# Patient Record
Sex: MALE | Race: WHITE | Employment: FULL TIME | URBAN - METROPOLITAN AREA
[De-identification: names, ages, dates, MRNs, and addresses within clinical notes are randomized per-mention and may not be internally consistent; named-entity substitution may affect disease eponyms.]

---

## 2017-09-14 ENCOUNTER — APPOINTMENT (OUTPATIENT)
Dept: CT IMAGING | Age: 47
End: 2017-09-14
Payer: COMMERCIAL

## 2017-09-14 ENCOUNTER — HOSPITAL ENCOUNTER (EMERGENCY)
Age: 47
Discharge: HOME | End: 2017-09-14
Attending: EMERGENCY MEDICINE
Payer: COMMERCIAL

## 2017-09-14 ENCOUNTER — APPOINTMENT (OUTPATIENT)
Dept: GENERAL RADIOLOGY | Age: 47
End: 2017-09-14
Payer: COMMERCIAL

## 2017-09-14 VITALS
RESPIRATION RATE: 18 BRPM | BODY MASS INDEX: 31.34 KG/M2 | HEART RATE: 89 BPM | TEMPERATURE: 98.1 F | DIASTOLIC BLOOD PRESSURE: 90 MMHG | OXYGEN SATURATION: 95 % | HEIGHT: 66 IN | WEIGHT: 195 LBS | SYSTOLIC BLOOD PRESSURE: 156 MMHG

## 2017-09-14 DIAGNOSIS — S40.021A CONTUSION, SHOULDER AND UPPER ARM, MULTIPLE SITES, RIGHT, INITIAL ENCOUNTER: ICD-10-CM

## 2017-09-14 DIAGNOSIS — S20.211A RIB CONTUSION, RIGHT, INITIAL ENCOUNTER: ICD-10-CM

## 2017-09-14 DIAGNOSIS — S13.9XXA SPRAIN OF NECK, INITIAL ENCOUNTER: ICD-10-CM
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
## 2017-09-14 DIAGNOSIS — S40.011A CONTUSION, SHOULDER AND UPPER ARM, MULTIPLE SITES, RIGHT, INITIAL ENCOUNTER: ICD-10-CM

## 2017-09-14 PROCEDURE — 72050 X-RAY EXAM NECK SPINE 4/5VWS: CPT

## 2017-09-14 PROCEDURE — 73200 CT UPPER EXTREMITY W/O DYE: CPT

## 2017-09-14 PROCEDURE — 99283 EMERGENCY DEPT VISIT LOW MDM: CPT

## 2017-09-14 PROCEDURE — 6370000000 HC RX 637 (ALT 250 FOR IP): Performed by: EMERGENCY MEDICINE

## 2017-09-14 PROCEDURE — 73030 X-RAY EXAM OF SHOULDER: CPT

## 2017-09-14 PROCEDURE — 71020 XR CHEST STANDARD TWO VW: CPT

## 2017-09-14 RX ORDER — TRAMADOL HYDROCHLORIDE 50 MG/1
50 TABLET ORAL EVERY 6 HOURS PRN
Qty: 20 TABLET | Refills: 0 | Status: SHIPPED | OUTPATIENT
Start: 2017-09-14 | End: 2017-09-24

## 2017-09-14 RX ORDER — CYCLOBENZAPRINE HCL 10 MG
10 TABLET ORAL 3 TIMES DAILY PRN
Qty: 15 TABLET | Refills: 0 | Status: SHIPPED | OUTPATIENT
Start: 2017-09-14 | End: 2017-09-24

## 2017-09-14 RX ORDER — TRAMADOL HYDROCHLORIDE 50 MG/1
100 TABLET ORAL ONCE
Status: COMPLETED | OUTPATIENT
Start: 2017-09-14 | End: 2017-09-14

## 2017-09-14 RX ADMIN — TRAMADOL HYDROCHLORIDE 100 MG: 50 TABLET, COATED ORAL at 03:26

## 2017-09-14 ASSESSMENT — PAIN DESCRIPTION - ORIENTATION: ORIENTATION: RIGHT

## 2017-09-14 ASSESSMENT — ENCOUNTER SYMPTOMS
COUGH: 0
BLOOD IN STOOL: 0
WHEEZING: 0
CHOKING: 0
FACIAL SWELLING: 0
CHEST TIGHTNESS: 0
ABDOMINAL PAIN: 0
STRIDOR: 0
SINUS PRESSURE: 0
EYE DISCHARGE: 0
VOMITING: 0
SORE THROAT: 0
VOICE CHANGE: 0
TROUBLE SWALLOWING: 0
EYE PAIN: 0
DIARRHEA: 0
SHORTNESS OF BREATH: 0
BACK PAIN: 0
CONSTIPATION: 0
EYE REDNESS: 0

## 2017-09-14 ASSESSMENT — PAIN DESCRIPTION - LOCATION: LOCATION: SHOULDER

## 2017-09-14 ASSESSMENT — PAIN SCALES - GENERAL
PAINLEVEL_OUTOF10: 2
PAINLEVEL_OUTOF10: 1

## 2017-09-14 ASSESSMENT — PAIN DESCRIPTION - DESCRIPTORS: DESCRIPTORS: ACHING

## 2017-09-25 ENCOUNTER — HOSPITAL ENCOUNTER (OUTPATIENT)
Dept: GENERAL RADIOLOGY | Age: 47
Setting detail: THERAPIES SERIES
Discharge: HOME | End: 2017-09-25

## 2017-09-25 DIAGNOSIS — S20.211A BRUISED RIB, RIGHT, INITIAL ENCOUNTER: ICD-10-CM

## 2017-10-13 ENCOUNTER — HOSPITAL ENCOUNTER (OUTPATIENT)
Dept: CT IMAGING | Age: 47
Discharge: HOME | End: 2017-10-13
Payer: COMMERCIAL

## 2017-10-13 VITALS — HEIGHT: 66 IN | WEIGHT: 195 LBS | BODY MASS INDEX: 31.34 KG/M2

## 2017-10-13 DIAGNOSIS — S22.39XA: ICD-10-CM

## 2017-10-13 DIAGNOSIS — T14.8XXA HEMATOMA: ICD-10-CM

## 2017-10-13 PROCEDURE — 71250 CT THORAX DX C-: CPT

## 2018-04-04 ENCOUNTER — HOSPITAL ENCOUNTER (EMERGENCY)
Age: 48
Discharge: HOME | End: 2018-04-04
Attending: EMERGENCY MEDICINE

## 2018-04-04 VITALS
BODY MASS INDEX: 32.95 KG/M2 | WEIGHT: 205 LBS | OXYGEN SATURATION: 98 % | HEIGHT: 66 IN | RESPIRATION RATE: 16 BRPM | DIASTOLIC BLOOD PRESSURE: 83 MMHG | TEMPERATURE: 98.4 F | SYSTOLIC BLOOD PRESSURE: 130 MMHG | HEART RATE: 99 BPM

## 2018-04-04 DIAGNOSIS — L03.119 CELLULITIS AND ABSCESS OF LEG: ICD-10-CM

## 2018-04-04 DIAGNOSIS — L02.419 CELLULITIS AND ABSCESS OF LEG: ICD-10-CM
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
## 2018-04-04 PROCEDURE — 2500000003 HC RX 250 WO HCPCS: Performed by: EMERGENCY MEDICINE

## 2018-04-04 PROCEDURE — 99282 EMERGENCY DEPT VISIT SF MDM: CPT

## 2018-04-04 PROCEDURE — 6360000002 HC RX W HCPCS: Performed by: EMERGENCY MEDICINE

## 2018-04-04 PROCEDURE — 6370000000 HC RX 637 (ALT 250 FOR IP): Performed by: EMERGENCY MEDICINE

## 2018-04-04 PROCEDURE — 96375 TX/PRO/DX INJ NEW DRUG ADDON: CPT

## 2018-04-04 PROCEDURE — 96365 THER/PROPH/DIAG IV INF INIT: CPT

## 2018-04-04 RX ORDER — GINSENG 100 MG
CAPSULE ORAL ONCE
Status: COMPLETED | OUTPATIENT
Start: 2018-04-04 | End: 2018-04-04

## 2018-04-04 RX ORDER — IBUPROFEN 200 MG
800 TABLET ORAL EVERY 6 HOURS PRN
COMMUNITY

## 2018-04-04 RX ORDER — HYDROCODONE BITARTRATE AND ACETAMINOPHEN 5; 325 MG/1; MG/1
1 TABLET ORAL EVERY 6 HOURS PRN
Qty: 10 TABLET | Refills: 0 | Status: SHIPPED | OUTPATIENT
Start: 2018-04-04 | End: 2018-04-11

## 2018-04-04 RX ORDER — CLINDAMYCIN HYDROCHLORIDE 300 MG/1
300 CAPSULE ORAL 3 TIMES DAILY
Qty: 30 CAPSULE | Refills: 0 | Status: SHIPPED | OUTPATIENT
Start: 2018-04-04 | End: 2018-04-14

## 2018-04-04 RX ORDER — KETOROLAC TROMETHAMINE 30 MG/ML
30 INJECTION, SOLUTION INTRAMUSCULAR; INTRAVENOUS ONCE
Status: COMPLETED | OUTPATIENT
Start: 2018-04-04 | End: 2018-04-04

## 2018-04-04 RX ORDER — CLINDAMYCIN PHOSPHATE 600 MG/50ML
600 INJECTION INTRAVENOUS ONCE
Status: COMPLETED | OUTPATIENT
Start: 2018-04-04 | End: 2018-04-04

## 2018-04-04 RX ORDER — IBUPROFEN 800 MG/1
800 TABLET ORAL EVERY 8 HOURS PRN
Qty: 20 TABLET | Refills: 0 | Status: SHIPPED | OUTPATIENT
Start: 2018-04-04

## 2018-04-04 RX ADMIN — CLINDAMYCIN PHOSPHATE 600 MG: 12 INJECTION, SOLUTION INTRAMUSCULAR; INTRAVENOUS at 13:58

## 2018-04-04 RX ADMIN — KETOROLAC TROMETHAMINE 30 MG: 30 INJECTION, SOLUTION INTRAMUSCULAR at 13:59

## 2018-04-04 RX ADMIN — BACITRACIN: 500 OINTMENT TOPICAL at 13:59

## 2018-04-04 ASSESSMENT — PAIN DESCRIPTION - DESCRIPTORS
DESCRIPTORS: ACHING
DESCRIPTORS: DULL;THROBBING

## 2018-04-04 ASSESSMENT — PAIN DESCRIPTION - FREQUENCY
FREQUENCY: CONTINUOUS
FREQUENCY: INTERMITTENT

## 2018-04-04 ASSESSMENT — ENCOUNTER SYMPTOMS
SINUS PRESSURE: 0
CONSTIPATION: 0
STRIDOR: 0
EYE REDNESS: 0
VOICE CHANGE: 0
BLOOD IN STOOL: 0
VOMITING: 0
SORE THROAT: 0
DIARRHEA: 0
CHEST TIGHTNESS: 0
CHOKING: 0
ABDOMINAL PAIN: 0
WHEEZING: 0
COUGH: 0
EYE PAIN: 0
EYE DISCHARGE: 0
SHORTNESS OF BREATH: 0
TROUBLE SWALLOWING: 0
FACIAL SWELLING: 0
BACK PAIN: 0

## 2018-04-04 ASSESSMENT — PAIN DESCRIPTION - PROGRESSION
CLINICAL_PROGRESSION: GRADUALLY WORSENING
CLINICAL_PROGRESSION: GRADUALLY IMPROVING

## 2018-04-04 ASSESSMENT — PAIN SCALES - GENERAL
PAINLEVEL_OUTOF10: 4
PAINLEVEL_OUTOF10: 5

## 2018-04-04 ASSESSMENT — PAIN DESCRIPTION - ORIENTATION
ORIENTATION: RIGHT
ORIENTATION: RIGHT

## 2018-04-04 ASSESSMENT — PAIN DESCRIPTION - LOCATION
LOCATION: LEG
LOCATION: LEG

## 2018-06-25 ENCOUNTER — HOSPITAL ENCOUNTER (OUTPATIENT)
Dept: PREADMISSION TESTING | Age: 48
Discharge: HOME | End: 2018-06-29
Payer: COMMERCIAL

## 2018-06-25 VITALS
BODY MASS INDEX: 31.23 KG/M2 | DIASTOLIC BLOOD PRESSURE: 90 MMHG | HEART RATE: 67 BPM | SYSTOLIC BLOOD PRESSURE: 142 MMHG | OXYGEN SATURATION: 97 % | HEIGHT: 67 IN | TEMPERATURE: 98 F | RESPIRATION RATE: 18 BRPM | WEIGHT: 199 LBS

## 2018-06-25 LAB
HCT VFR BLD CALC: 44.8 % (ref 42–52)
HEMOGLOBIN: 15.3 G/DL (ref 14–18)
MCH RBC QN AUTO: 29.2 PG (ref 27–31.3)
MCHC RBC AUTO-ENTMCNC: 34.1 % (ref 33–37)
MCV RBC AUTO: 85.6 FL (ref 80–100)
PDW BLD-RTO: 14 % (ref 11.5–14.5)
PLATELET # BLD: 205 K/UL (ref 130–400)
RBC # BLD: 5.23 M/UL (ref 4.7–6.1)
SPECIMEN STATUS: NORMAL
WBC # BLD: 6.2 K/UL (ref 4.8–10.8)

## 2018-06-25 PROCEDURE — 85027 COMPLETE CBC AUTOMATED: CPT

## 2018-06-25 RX ORDER — RANITIDINE 150 MG/1
150 TABLET ORAL DAILY PRN
COMMUNITY

## 2018-06-26 ENCOUNTER — HOSPITAL ENCOUNTER (OUTPATIENT)
Age: 48
Setting detail: OUTPATIENT SURGERY
Discharge: HOME | End: 2018-06-26
Attending: ORTHOPAEDIC SURGERY | Admitting: ORTHOPAEDIC SURGERY
Payer: COMMERCIAL

## 2018-06-26 ENCOUNTER — ANESTHESIA EVENT (OUTPATIENT)
Dept: OPERATING ROOM | Age: 48
End: 2018-06-26
Payer: COMMERCIAL

## 2018-06-26 ENCOUNTER — ANESTHESIA (OUTPATIENT)
Dept: OPERATING ROOM | Age: 48
End: 2018-06-26
Payer: COMMERCIAL

## 2018-06-26 VITALS
WEIGHT: 199 LBS | DIASTOLIC BLOOD PRESSURE: 100 MMHG | RESPIRATION RATE: 14 BRPM | HEIGHT: 67 IN | TEMPERATURE: 97.2 F | SYSTOLIC BLOOD PRESSURE: 153 MMHG | BODY MASS INDEX: 31.23 KG/M2 | OXYGEN SATURATION: 100 % | HEART RATE: 72 BPM

## 2018-06-26 VITALS — TEMPERATURE: 96.1 F | SYSTOLIC BLOOD PRESSURE: 108 MMHG | DIASTOLIC BLOOD PRESSURE: 73 MMHG | OXYGEN SATURATION: 100 %

## 2018-06-26 DIAGNOSIS — Z98.890 STATUS POST ARTHROSCOPY OF SHOULDER: Primary | ICD-10-CM

## 2018-06-26 PROCEDURE — 6360000002 HC RX W HCPCS: Performed by: ANESTHESIOLOGY

## 2018-06-26 PROCEDURE — 6360000002 HC RX W HCPCS: Performed by: STUDENT IN AN ORGANIZED HEALTH CARE EDUCATION/TRAINING PROGRAM

## 2018-06-26 PROCEDURE — 7100000000 HC PACU RECOVERY - FIRST 15 MIN: Performed by: ORTHOPAEDIC SURGERY

## 2018-06-26 PROCEDURE — 3700000001 HC ADD 15 MINUTES (ANESTHESIA): Performed by: ORTHOPAEDIC SURGERY

## 2018-06-26 PROCEDURE — 2580000003 HC RX 258: Performed by: ORTHOPAEDIC SURGERY

## 2018-06-26 PROCEDURE — 2500000003 HC RX 250 WO HCPCS: Performed by: ANESTHESIOLOGY

## 2018-06-26 PROCEDURE — 3700000000 HC ANESTHESIA ATTENDED CARE: Performed by: ORTHOPAEDIC SURGERY

## 2018-06-26 PROCEDURE — 3600000014 HC SURGERY LEVEL 4 ADDTL 15MIN: Performed by: ORTHOPAEDIC SURGERY

## 2018-06-26 PROCEDURE — 2580000003 HC RX 258: Performed by: NURSE PRACTITIONER

## 2018-06-26 PROCEDURE — 7100000010 HC PHASE II RECOVERY - FIRST 15 MIN: Performed by: ORTHOPAEDIC SURGERY

## 2018-06-26 PROCEDURE — 2500000003 HC RX 250 WO HCPCS: Performed by: NURSE ANESTHETIST, CERTIFIED REGISTERED

## 2018-06-26 PROCEDURE — 3600000004 HC SURGERY LEVEL 4 BASE: Performed by: ORTHOPAEDIC SURGERY

## 2018-06-26 PROCEDURE — 6360000002 HC RX W HCPCS: Performed by: NURSE ANESTHETIST, CERTIFIED REGISTERED

## 2018-06-26 PROCEDURE — 7100000011 HC PHASE II RECOVERY - ADDTL 15 MIN: Performed by: ORTHOPAEDIC SURGERY

## 2018-06-26 PROCEDURE — 2500000003 HC RX 250 WO HCPCS: Performed by: NURSE PRACTITIONER

## 2018-06-26 PROCEDURE — 6360000002 HC RX W HCPCS: Performed by: ORTHOPAEDIC SURGERY

## 2018-06-26 PROCEDURE — 2580000003 HC RX 258: Performed by: ANESTHESIOLOGY

## 2018-06-26 PROCEDURE — 64415 NJX AA&/STRD BRCH PLXS IMG: CPT | Performed by: ANESTHESIOLOGY

## 2018-06-26 PROCEDURE — 7100000001 HC PACU RECOVERY - ADDTL 15 MIN: Performed by: ORTHOPAEDIC SURGERY

## 2018-06-26 RX ORDER — SODIUM CHLORIDE 0.9 % (FLUSH) 0.9 %
10 SYRINGE (ML) INJECTION EVERY 12 HOURS SCHEDULED
Status: DISCONTINUED | OUTPATIENT
Start: 2018-06-26 | End: 2018-06-26 | Stop reason: HOSPADM

## 2018-06-26 RX ORDER — PROPOFOL 10 MG/ML
INJECTION, EMULSION INTRAVENOUS PRN
Status: DISCONTINUED | OUTPATIENT
Start: 2018-06-26 | End: 2018-06-26 | Stop reason: SDUPTHER

## 2018-06-26 RX ORDER — SODIUM CHLORIDE 0.9 % (FLUSH) 0.9 %
10 SYRINGE (ML) INJECTION PRN
Status: DISCONTINUED | OUTPATIENT
Start: 2018-06-26 | End: 2018-06-26 | Stop reason: HOSPADM

## 2018-06-26 RX ORDER — SODIUM CHLORIDE, SODIUM LACTATE, POTASSIUM CHLORIDE, CALCIUM CHLORIDE 600; 310; 30; 20 MG/100ML; MG/100ML; MG/100ML; MG/100ML
INJECTION, SOLUTION INTRAVENOUS CONTINUOUS
Status: DISCONTINUED | OUTPATIENT
Start: 2018-06-26 | End: 2018-06-26 | Stop reason: HOSPADM

## 2018-06-26 RX ORDER — MAGNESIUM HYDROXIDE 1200 MG/15ML
LIQUID ORAL CONTINUOUS PRN
Status: DISCONTINUED | OUTPATIENT
Start: 2018-06-26 | End: 2018-06-26 | Stop reason: HOSPADM

## 2018-06-26 RX ORDER — ONDANSETRON 2 MG/ML
INJECTION INTRAMUSCULAR; INTRAVENOUS PRN
Status: DISCONTINUED | OUTPATIENT
Start: 2018-06-26 | End: 2018-06-26 | Stop reason: SDUPTHER

## 2018-06-26 RX ORDER — FENTANYL CITRATE 50 UG/ML
50 INJECTION, SOLUTION INTRAMUSCULAR; INTRAVENOUS EVERY 10 MIN PRN
Status: DISCONTINUED | OUTPATIENT
Start: 2018-06-26 | End: 2018-06-26 | Stop reason: HOSPADM

## 2018-06-26 RX ORDER — ROCURONIUM BROMIDE 10 MG/ML
INJECTION, SOLUTION INTRAVENOUS PRN
Status: DISCONTINUED | OUTPATIENT
Start: 2018-06-26 | End: 2018-06-26 | Stop reason: SDUPTHER

## 2018-06-26 RX ORDER — OXYCODONE HYDROCHLORIDE AND ACETAMINOPHEN 5; 325 MG/1; MG/1
2 TABLET ORAL EVERY 4 HOURS PRN
Status: DISCONTINUED | OUTPATIENT
Start: 2018-06-26 | End: 2018-06-26 | Stop reason: HOSPADM

## 2018-06-26 RX ORDER — LIDOCAINE HYDROCHLORIDE 10 MG/ML
1 INJECTION, SOLUTION EPIDURAL; INFILTRATION; INTRACAUDAL; PERINEURAL
Status: COMPLETED | OUTPATIENT
Start: 2018-06-26 | End: 2018-06-26

## 2018-06-26 RX ORDER — LIDOCAINE HYDROCHLORIDE 10 MG/ML
INJECTION, SOLUTION EPIDURAL; INFILTRATION; INTRACAUDAL; PERINEURAL PRN
Status: DISCONTINUED | OUTPATIENT
Start: 2018-06-26 | End: 2018-06-26 | Stop reason: SDUPTHER

## 2018-06-26 RX ORDER — METOCLOPRAMIDE HYDROCHLORIDE 5 MG/ML
10 INJECTION INTRAMUSCULAR; INTRAVENOUS
Status: DISCONTINUED | OUTPATIENT
Start: 2018-06-26 | End: 2018-06-26 | Stop reason: HOSPADM

## 2018-06-26 RX ORDER — ACETAMINOPHEN 325 MG/1
650 TABLET ORAL EVERY 4 HOURS PRN
Status: DISCONTINUED | OUTPATIENT
Start: 2018-06-26 | End: 2018-06-26 | Stop reason: HOSPADM

## 2018-06-26 RX ORDER — MIDAZOLAM HYDROCHLORIDE 1 MG/ML
INJECTION INTRAMUSCULAR; INTRAVENOUS PRN
Status: DISCONTINUED | OUTPATIENT
Start: 2018-06-26 | End: 2018-06-26 | Stop reason: SDUPTHER

## 2018-06-26 RX ORDER — HYDROCODONE BITARTRATE AND ACETAMINOPHEN 5; 325 MG/1; MG/1
1 TABLET ORAL EVERY 6 HOURS PRN
Qty: 30 TABLET | Refills: 0 | Status: SHIPPED | OUTPATIENT
Start: 2018-06-26 | End: 2018-07-03

## 2018-06-26 RX ORDER — ONDANSETRON 2 MG/ML
4 INJECTION INTRAMUSCULAR; INTRAVENOUS EVERY 6 HOURS PRN
Status: DISCONTINUED | OUTPATIENT
Start: 2018-06-26 | End: 2018-06-26 | Stop reason: HOSPADM

## 2018-06-26 RX ORDER — MORPHINE SULFATE 4 MG/ML
4 INJECTION, SOLUTION INTRAMUSCULAR; INTRAVENOUS
Status: DISCONTINUED | OUTPATIENT
Start: 2018-06-26 | End: 2018-06-26 | Stop reason: HOSPADM

## 2018-06-26 RX ORDER — DIPHENHYDRAMINE HYDROCHLORIDE 50 MG/ML
12.5 INJECTION INTRAMUSCULAR; INTRAVENOUS
Status: DISCONTINUED | OUTPATIENT
Start: 2018-06-26 | End: 2018-06-26 | Stop reason: HOSPADM

## 2018-06-26 RX ORDER — MORPHINE SULFATE 2 MG/ML
2 INJECTION, SOLUTION INTRAMUSCULAR; INTRAVENOUS
Status: DISCONTINUED | OUTPATIENT
Start: 2018-06-26 | End: 2018-06-26 | Stop reason: HOSPADM

## 2018-06-26 RX ORDER — FENTANYL CITRATE 50 UG/ML
INJECTION, SOLUTION INTRAMUSCULAR; INTRAVENOUS PRN
Status: DISCONTINUED | OUTPATIENT
Start: 2018-06-26 | End: 2018-06-26 | Stop reason: SDUPTHER

## 2018-06-26 RX ORDER — MEPERIDINE HYDROCHLORIDE 25 MG/ML
12.5 INJECTION INTRAMUSCULAR; INTRAVENOUS; SUBCUTANEOUS EVERY 5 MIN PRN
Status: DISCONTINUED | OUTPATIENT
Start: 2018-06-26 | End: 2018-06-26 | Stop reason: HOSPADM

## 2018-06-26 RX ORDER — OXYCODONE HYDROCHLORIDE AND ACETAMINOPHEN 5; 325 MG/1; MG/1
1 TABLET ORAL EVERY 4 HOURS PRN
Status: DISCONTINUED | OUTPATIENT
Start: 2018-06-26 | End: 2018-06-26 | Stop reason: HOSPADM

## 2018-06-26 RX ORDER — HYDROCODONE BITARTRATE AND ACETAMINOPHEN 5; 325 MG/1; MG/1
1 TABLET ORAL PRN
Status: DISCONTINUED | OUTPATIENT
Start: 2018-06-26 | End: 2018-06-26 | Stop reason: HOSPADM

## 2018-06-26 RX ORDER — ROPIVACAINE HYDROCHLORIDE 5 MG/ML
INJECTION, SOLUTION EPIDURAL; INFILTRATION; PERINEURAL PRN
Status: DISCONTINUED | OUTPATIENT
Start: 2018-06-26 | End: 2018-06-26 | Stop reason: SDUPTHER

## 2018-06-26 RX ORDER — ONDANSETRON 2 MG/ML
4 INJECTION INTRAMUSCULAR; INTRAVENOUS
Status: DISCONTINUED | OUTPATIENT
Start: 2018-06-26 | End: 2018-06-26 | Stop reason: HOSPADM

## 2018-06-26 RX ORDER — HYDROCODONE BITARTRATE AND ACETAMINOPHEN 5; 325 MG/1; MG/1
2 TABLET ORAL PRN
Status: DISCONTINUED | OUTPATIENT
Start: 2018-06-26 | End: 2018-06-26 | Stop reason: HOSPADM

## 2018-06-26 RX ADMIN — LIDOCAINE HYDROCHLORIDE 0.1 ML: 10 INJECTION, SOLUTION EPIDURAL; INFILTRATION; INTRACAUDAL; PERINEURAL at 15:06

## 2018-06-26 RX ADMIN — Medication 0.1 MG: at 18:04

## 2018-06-26 RX ADMIN — FENTANYL CITRATE 50 MCG: 50 INJECTION, SOLUTION INTRAMUSCULAR; INTRAVENOUS at 17:32

## 2018-06-26 RX ADMIN — ROCURONIUM BROMIDE 20 MG: 10 INJECTION INTRAVENOUS at 18:18

## 2018-06-26 RX ADMIN — ROPIVACAINE HYDROCHLORIDE 30 ML: 5 INJECTION, SOLUTION EPIDURAL; INFILTRATION; PERINEURAL at 15:25

## 2018-06-26 RX ADMIN — SUGAMMADEX 200 MG: 100 INJECTION, SOLUTION INTRAVENOUS at 19:16

## 2018-06-26 RX ADMIN — Medication 2 G: at 17:35

## 2018-06-26 RX ADMIN — MIDAZOLAM HYDROCHLORIDE 4 MG: 1 INJECTION, SOLUTION INTRAMUSCULAR; INTRAVENOUS at 15:21

## 2018-06-26 RX ADMIN — Medication 0.1 MG: at 18:17

## 2018-06-26 RX ADMIN — Medication 0.1 MG: at 17:48

## 2018-06-26 RX ADMIN — ONDANSETRON 4 MG: 2 INJECTION INTRAMUSCULAR; INTRAVENOUS at 19:12

## 2018-06-26 RX ADMIN — PHENYLEPHRINE HYDROCHLORIDE 50 MCG/MIN: 10 INJECTION INTRAVENOUS at 18:40

## 2018-06-26 RX ADMIN — LIDOCAINE HYDROCHLORIDE 50 MG: 10 INJECTION, SOLUTION EPIDURAL; INFILTRATION; INTRACAUDAL; PERINEURAL at 17:32

## 2018-06-26 RX ADMIN — ROCURONIUM BROMIDE 50 MG: 10 INJECTION INTRAVENOUS at 17:32

## 2018-06-26 RX ADMIN — PROPOFOL 200 MG: 10 INJECTION, EMULSION INTRAVENOUS at 17:32

## 2018-06-26 RX ADMIN — SODIUM CHLORIDE, POTASSIUM CHLORIDE, SODIUM LACTATE AND CALCIUM CHLORIDE: 600; 310; 30; 20 INJECTION, SOLUTION INTRAVENOUS at 18:10

## 2018-06-26 RX ADMIN — SODIUM CHLORIDE, POTASSIUM CHLORIDE, SODIUM LACTATE AND CALCIUM CHLORIDE: 600; 310; 30; 20 INJECTION, SOLUTION INTRAVENOUS at 15:06

## 2018-06-26 ASSESSMENT — PULMONARY FUNCTION TESTS
PIF_VALUE: 18
PIF_VALUE: 16
PIF_VALUE: 0
PIF_VALUE: 5
PIF_VALUE: 16
PIF_VALUE: 18
PIF_VALUE: 0
PIF_VALUE: 3
PIF_VALUE: 18
PIF_VALUE: 17
PIF_VALUE: 15
PIF_VALUE: 21
PIF_VALUE: 19
PIF_VALUE: 17
PIF_VALUE: 17
PIF_VALUE: 19
PIF_VALUE: 19
PIF_VALUE: 17
PIF_VALUE: 16
PIF_VALUE: 18
PIF_VALUE: 16
PIF_VALUE: 17
PIF_VALUE: 18
PIF_VALUE: 1
PIF_VALUE: 3
PIF_VALUE: 2
PIF_VALUE: 0
PIF_VALUE: 1
PIF_VALUE: 16
PIF_VALUE: 19
PIF_VALUE: 17
PIF_VALUE: 17
PIF_VALUE: 11
PIF_VALUE: 17
PIF_VALUE: 19
PIF_VALUE: 16
PIF_VALUE: 16
PIF_VALUE: 17
PIF_VALUE: 19
PIF_VALUE: 15
PIF_VALUE: 17
PIF_VALUE: 16
PIF_VALUE: 18
PIF_VALUE: 1
PIF_VALUE: 18
PIF_VALUE: 17
PIF_VALUE: 16
PIF_VALUE: 17
PIF_VALUE: 16
PIF_VALUE: 1
PIF_VALUE: 19
PIF_VALUE: 17
PIF_VALUE: 18
PIF_VALUE: 17
PIF_VALUE: 15
PIF_VALUE: 17
PIF_VALUE: 19
PIF_VALUE: 16
PIF_VALUE: 0
PIF_VALUE: 16
PIF_VALUE: 16
PIF_VALUE: 1
PIF_VALUE: 17
PIF_VALUE: 19
PIF_VALUE: 16
PIF_VALUE: 16
PIF_VALUE: 19
PIF_VALUE: 19
PIF_VALUE: 17
PIF_VALUE: 19
PIF_VALUE: 17
PIF_VALUE: 19
PIF_VALUE: 16
PIF_VALUE: 19
PIF_VALUE: 17
PIF_VALUE: 11
PIF_VALUE: 4
PIF_VALUE: 18
PIF_VALUE: 17
PIF_VALUE: 17
PIF_VALUE: 3
PIF_VALUE: 17
PIF_VALUE: 17
PIF_VALUE: 19
PIF_VALUE: 17
PIF_VALUE: 17
PIF_VALUE: 18
PIF_VALUE: 17
PIF_VALUE: 19
PIF_VALUE: 17
PIF_VALUE: 17
PIF_VALUE: 2
PIF_VALUE: 17
PIF_VALUE: 16
PIF_VALUE: 17
PIF_VALUE: 16
PIF_VALUE: 19
PIF_VALUE: 17
PIF_VALUE: 18
PIF_VALUE: 17
PIF_VALUE: 16
PIF_VALUE: 17
PIF_VALUE: 0
PIF_VALUE: 18
PIF_VALUE: 17
PIF_VALUE: 19
PIF_VALUE: 19
PIF_VALUE: 18
PIF_VALUE: 17
PIF_VALUE: 16
PIF_VALUE: 20
PIF_VALUE: 17

## 2018-06-26 ASSESSMENT — LIFESTYLE VARIABLES: SMOKING_STATUS: 1

## 2018-06-26 ASSESSMENT — PAIN SCALES - GENERAL: PAINLEVEL_OUTOF10: 0

## 2020-07-27 ENCOUNTER — HOSPITAL ENCOUNTER (OUTPATIENT)
Dept: PHYSICAL THERAPY | Age: 50
Setting detail: THERAPIES SERIES
Discharge: HOME | End: 2020-07-27
Payer: COMMERCIAL

## 2020-07-27 ASSESSMENT — PAIN SCALES - GENERAL: PAINLEVEL_OUTOF10: 4

## 2020-07-27 ASSESSMENT — PAIN DESCRIPTION - ONSET: ONSET: SUDDEN

## 2020-07-27 ASSESSMENT — PAIN DESCRIPTION - LOCATION: LOCATION: SHOULDER

## 2020-07-27 ASSESSMENT — PAIN DESCRIPTION - ORIENTATION: ORIENTATION: RIGHT

## 2020-07-27 ASSESSMENT — PAIN DESCRIPTION - DESCRIPTORS: DESCRIPTORS: THROBBING;CONSTANT;ACHING

## 2020-07-27 ASSESSMENT — PAIN DESCRIPTION - FREQUENCY: FREQUENCY: CONTINUOUS
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
## 2020-07-29 ENCOUNTER — HOSPITAL ENCOUNTER (OUTPATIENT)
Dept: PHYSICAL THERAPY | Age: 50
Setting detail: THERAPIES SERIES
Discharge: HOME | End: 2020-07-29
Payer: COMMERCIAL

## 2020-07-29 PROCEDURE — 97140 MANUAL THERAPY 1/> REGIONS: CPT

## 2020-07-29 PROCEDURE — 97110 THERAPEUTIC EXERCISES: CPT

## 2020-07-29 ASSESSMENT — PAIN DESCRIPTION - ORIENTATION: ORIENTATION: RIGHT

## 2020-07-29 ASSESSMENT — PAIN DESCRIPTION - LOCATION: LOCATION: SHOULDER

## 2020-07-29 ASSESSMENT — PAIN SCALES - GENERAL: PAINLEVEL_OUTOF10: 2

## 2020-07-29 NOTE — PROGRESS NOTES
[id number] [facility]  Treatment Note     Date: 2020  Patient: Tracey Fried  : 1970  ACCT #: [de-identified]  Referring Practitioner: Dr. Melodie Morel  Diagnosis: Sprain of the right shoulder  Visit Information:  PT Visit Information  PT Insurance Information: [address] [facility] [id number]  Total # of Visits Approved: 18  Total # of Visits to Date: 2  Subjective: Pt reports he was very sore after the eval but it only lasted about an hour. HEP Compliance:  [] Good [x] Fair [] Poor [] Reports not doing due to:  Vital Signs  Patient Currently in Pain: Yes   Pain Screening  Patient Currently in Pain: Yes  Pain Assessment  Pain Assessment: 0-10  Pain Level: 2  Pain Location: Shoulder  Pain Orientation: Right  OBJECTIVE:   Exercises  Exercise 1: lower trap  strengthening x 10 with 2 lbs  Exercise 2: middle trap strengthening x10 with 2lbs  Exercise 3: quadruped A/P weight shifting progressing to unilateral arm lifts  Exercise 4: prone rows x 10 with 2 lbs. Exercise 5: sidelying punches with 2 lbs  Exercise 6: sidelying abduction x 10 with 2 lbs. Exercise 7: prone arm lifts (superman)  x 10  Strength: [x] NT  [] MMT completed:   ROM: [] NT  [] ROM measurements:   Manual:   Manual therapy  Joint mobilization: right scapular mobilization  Assessment:   Activity Tolerance  Activity Tolerance: Patient limited by pain, Patient limited by endurance  Body structures, Functions, Activity limitations: Decreased ROM, Decreased strength, Decreased functional mobility , Increased pain  Assessment: Pain continues to be present in the right scapular region above and medial to the scapular spine. Pain Level initially 2/10 and kathy to 5/10 with prone exercises. Pt refused ice for pain control stating, it has never helped in the past.  Prognosis: Good  PT Education: Home Exercise Program  Patient Education: Skyla Kang weight shifting onto extended arms, prone middle and lower trap strengthening.  Pt required verbal cues initially to perform

## 2020-07-30 ENCOUNTER — HOSPITAL ENCOUNTER (OUTPATIENT)
Dept: PHYSICAL THERAPY | Age: 50
Setting detail: THERAPIES SERIES
Discharge: HOME | End: 2020-07-30
Payer: COMMERCIAL

## 2020-07-30 PROCEDURE — 97110 THERAPEUTIC EXERCISES: CPT

## 2020-07-30 PROCEDURE — 97140 MANUAL THERAPY 1/> REGIONS: CPT

## 2020-07-30 ASSESSMENT — PAIN DESCRIPTION - PAIN TYPE: TYPE: CHRONIC PAIN

## 2020-07-30 ASSESSMENT — PAIN DESCRIPTION - ORIENTATION: ORIENTATION: RIGHT

## 2020-07-30 ASSESSMENT — PAIN DESCRIPTION - FREQUENCY: FREQUENCY: CONTINUOUS

## 2020-07-30 ASSESSMENT — PAIN DESCRIPTION - LOCATION: LOCATION: SHOULDER

## 2020-07-30 ASSESSMENT — PAIN DESCRIPTION - DESCRIPTORS: DESCRIPTORS: ACHING;THROBBING

## 2020-07-30 ASSESSMENT — PAIN SCALES - GENERAL: PAINLEVEL_OUTOF10: 4

## 2020-07-30 NOTE — PROGRESS NOTES
[address] [facility]   Treatment Note    Date: 2020  Patient: Luzmaria Rhodes  : 1970  ACCT #: [de-identified]  Referring Practitioner: Dr. Dung Maynard  Diagnosis: Sprain of the right shoulder  Visit Information:  PT Visit Information  PT Insurance Information: [facility] [id number]  Total # of Visits Approved: 18  Total # of Visits to Date: 3  Plan of Care/Certification Expiration Date: 20  Subjective: Pt reports continued soreness at the right shoulder   HEP Compliance:  [x] Good [] Fair [] Poor [] Reports not doing due to:  Pain Assessment  Pain Assessment: Faces  Pain Level: 4  Pain Type: Chronic pain  Pain Location: Shoulder  Pain Orientation: Right  Pain Descriptors: Aching; Throbbing  Pain Frequency: Continuous  OBJECTIVE:   Exercises  Exercise 1: lower trap  strengthening x 10 with 3 lbs  Exercise 2: middle trap strengthening x10 with 3 lbs  Exercise 3: quadruped A/P and lateral weight shifting progressing to unilateral arm lifts x 10  Exercise 4: prone rows x 10 with 2 lbs. Exercise 5: sidelying punches with 2 lbs  Exercise 6: sidelying abduction x 10 with 2 lbs.   Exercise 7: prone arm lifts (superman)  x 10  Exercise 8: forward wall slides with BUEs  Exercise 9: red therapy ball holds with RUE elbow flexed at side moving arm towards scaption to pt's tolerance and back to midline alternating with bilatera overhead ball lift x 5  Manual:   Manual therapy  Joint mobilization: Performed right scapular mobilization in all planes  Soft Tissue Mobalization: Performed deep STM at the right rhomboid/middle trap region  after scapular mobilization  Assessment:   Activity Tolerance  Activity Tolerance: Patient limited by pain, Patient limited by endurance  Assessment: Right scapular weakness and pain limiting exercises   Goals:  Short term goals  Time Frame for Short term goals: 9 visits  Short term goal 1: Pt will exhibit 130 active elevation of the right shoulder  Short term goal 2: Pt will exhibit 5/5 right scapular strength  Short term goal 3: Pain levels at the right shoulder will decrease to 2/10  Short term goal 4: Pt will be independent with a HEP  Long term goals  Time Frame for Long term goals : 18 visits  Long term goal 1: Pt will exhibit 140 active elevation and >60 IR at the Right shoulder  Long term goal 2: Pt will be able to perform RUE weightbearing push off from the floor to transition from prone to sit and to stand without pain levels going over 4/10  Long term goal 3: RUE disability score will increase to 70/80 or greater  Long term goal 4: Pt will complete lifting activities of 25 lbs or more without an increase in pain  Progress toward goals:fair  POST-PAIN       Pain Rating (0-10 pain scale):  4 /10   Location and pain description same as pre-treatment unless indicated. Action: [] NA   [x] Perform HEP  [] Meds as prescribed  [] Modalities as prescribed   [] Call Physician   Frequency/Duration:  Plan  Times per week: 3  Plan weeks: 5  Current Treatment Recommendations: Strengthening, Manual Therapy - Joint Manipulation, Manual Therapy - Soft Tissue Mobilization, ROM, Home Exercise Program   Pt to continue current HEP. See objective section for any therapeutic exercise changes, additions or modifications this date.   PT Individual Minutes  Time In: 1304  Time Out: 2551  Minutes: 43  Timed Code Treatment Minutes: 43 Minutes    Modality Time In Time Out Total Minutes Units   Ther ex ([id number]) 8364 0913 28 2   Manual Therapy [phone]       Signature:  Electronically signed by Ayana Cohen PT on 7/30/20 at 6:18 PM EDT

## 2020-08-03 ENCOUNTER — HOSPITAL ENCOUNTER (OUTPATIENT)
Dept: PHYSICAL THERAPY | Age: 50
Setting detail: THERAPIES SERIES
Discharge: HOME | End: 2020-08-03
Payer: COMMERCIAL

## 2020-08-03 PROCEDURE — 97124 MASSAGE THERAPY: CPT

## 2020-08-03 PROCEDURE — 97110 THERAPEUTIC EXERCISES: CPT

## 2020-08-03 ASSESSMENT — PAIN DESCRIPTION - LOCATION: LOCATION: SHOULDER

## 2020-08-03 ASSESSMENT — PAIN DESCRIPTION - FREQUENCY: FREQUENCY: CONTINUOUS

## 2020-08-03 ASSESSMENT — PAIN DESCRIPTION - ORIENTATION: ORIENTATION: RIGHT

## 2020-08-03 ASSESSMENT — PAIN DESCRIPTION - DESCRIPTORS: DESCRIPTORS: ACHING;CONSTANT

## 2020-08-03 ASSESSMENT — PAIN SCALES - GENERAL: PAINLEVEL_OUTOF10: 4

## 2020-08-03 ASSESSMENT — PAIN DESCRIPTION - PROGRESSION: CLINICAL_PROGRESSION: NOT CHANGED

## 2020-08-03 ASSESSMENT — PAIN DESCRIPTION - PAIN TYPE: TYPE: CHRONIC PAIN

## 2020-08-06 ENCOUNTER — HOSPITAL ENCOUNTER (OUTPATIENT)
Dept: PHYSICAL THERAPY | Age: 50
Setting detail: THERAPIES SERIES
Discharge: HOME | End: 2020-08-06
Payer: COMMERCIAL

## 2020-08-06 PROCEDURE — 97110 THERAPEUTIC EXERCISES: CPT

## 2020-08-06 PROCEDURE — 97124 MASSAGE THERAPY: CPT

## 2020-08-06 ASSESSMENT — PAIN DESCRIPTION - LOCATION: LOCATION: SHOULDER

## 2020-08-06 ASSESSMENT — PAIN DESCRIPTION - FREQUENCY: FREQUENCY: CONTINUOUS

## 2020-08-06 ASSESSMENT — PAIN SCALES - GENERAL: PAINLEVEL_OUTOF10: 4

## 2020-08-06 ASSESSMENT — PAIN DESCRIPTION - DESCRIPTORS: DESCRIPTORS: ACHING;THROBBING

## 2020-08-06 ASSESSMENT — PAIN DESCRIPTION - ORIENTATION: ORIENTATION: RIGHT

## 2020-08-06 NOTE — PROGRESS NOTES
Physical Therapy  Daily Treatment Note  Date: 2020  Patient Name: Nicole Vasquez  MRN: [id number]     :   1970    Subjective:   General  Referring Practitioner: Dr. Lilli Romero  PT Visit Information  PT Insurance Information: [facility]  Total # of Visits Approved: 18  Total # of Visits to Date: 5  Plan of Care/Certification Expiration Date: 20  Subjective  Subjective: No migraine today. Shoulder still sore  Pain Assessment  Pain Assessment: 0-10  Pain Level: 4  Pain Location: Shoulder  Pain Orientation: Right  Pain Descriptors: Aching; Throbbing  Pain Frequency: Continuous       Treatment Activities:   Manual therapy  Soft Tissue Mobalization: Performed deep STM at the right rhomboid/middle trap region  x 10 min      AROM RUE (degrees)  RUE AROM : Exceptions  R Shoulder Flexion 0-180: 160  R Shoulder Extension 0-45: 45  R Shoulder ABduction 0-180: 120  R Shoulder Int Rotation  0-70: 45  R Shoulder Ext Rotation 0-90: 88  R Should Horiz ABduction 0-40: 40  R Shoulder Horiz ADduction 0-120: 120  R Elbow Flexion 0-145: 145  R Forearm Supination  0-90: 80       Exercises  Exercise 1: prone right shoulder extension 2# 10x , abduction thumb down (at approx.  80 degrees of abd), and prone lower trap. strengthening 2x10 reps each  Exercise 2: prone rows 3# 2x10 r  Exercise 3: sidelying punches 3# 2x10 reps  Exercise 4: side lying abduction 3# 2x10 reps  Exercise 5: standing towel stretch for IR 5x  Exercise 6: forward wall slides with BUE's 10x  Exercise 7: red therapy ball holds with RUE elbow flexed at side moving arm towards scaption to pt's tolerance and back to midline alternating with bilateral overhead ball lift x 5  Exercise 8: pulley's flexion and abduction 15x each  Exercise 9: corner pectoralis stretch 5x  Exercise 10: right upper trap stretch in sitting with hand behind back   3x 30 sec. hold  Exercise 11: side lying ER with towel under armpit 3#        Manual therapy  Soft Tissue Mobalization: Performed deep STM at the right rhomboid/middle trap region  x 10 min         Assessment:   Conditions Requiring Skilled Therapeutic Intervention  Body structures, Functions, Activity limitations: Decreased strength;Decreased ROM; Decreased endurance  Assessment: Pt. limited by pain and exhibits Right scapular weakness during exercises. Pt has increased standing shoulder flexion to 160 deg (states painful after 135 deg)           Goals:  Short term goals  Time Frame for Short term goals: 9 visits  Short term goal 1: Pt will exhibit 130 active elevation of the right shoulder (met)  Short term goal 2: Pt will exhibit 5/5 right scapular strength  Short term goal 3: Pain levels at the right shoulder will decrease to 2/10  Short term goal 4: Pt will be independent with a HEP  Long term goals  Time Frame for Long term goals : 18 visits  Long term goal 1: Pt will exhibit 140 active elevation and >60 IR at the Right shoulder  Long term goal 2: Pt will be able to perform RUE weightbearing push off from the floor to transition from prone to sit and to stand without pain levels going over 4/10  Long term goal 3: RUE disability score will increase to 70/80 or greater  Long term goal 4: Pt will complete lifting activities of 25 lbs or more without an increase in pain    Plan:    Plan  Times per week: 3  Plan weeks: 5  Current Treatment Recommendations: Strengthening, Manual Therapy - Joint Manipulation, Manual Therapy - Soft Tissue Mobilization, ROM, Home Exercise Program  Plan Comment: Continue with strengthening exercises as tolerated. Review stretches.   Timed Code Treatment Minutes: 47 Minutes     Therapy Time  Timed activitiy Time in Time out minutes units   There ex [id number] 1300  1337  37  2   Soft tiss mobs [id number] 1337  1347  10  1             Timed Code Treatment Minutes: [address] [facility]

## 2020-08-10 ENCOUNTER — HOSPITAL ENCOUNTER (OUTPATIENT)
Dept: PHYSICAL THERAPY | Age: 50
Setting detail: THERAPIES SERIES
Discharge: HOME | End: 2020-08-10
Payer: COMMERCIAL

## 2020-08-10 PROCEDURE — 97110 THERAPEUTIC EXERCISES: CPT

## 2020-08-10 ASSESSMENT — PAIN DESCRIPTION - LOCATION: LOCATION: SHOULDER

## 2020-08-10 ASSESSMENT — PAIN SCALES - GENERAL: PAINLEVEL_OUTOF10: 3

## 2020-08-10 ASSESSMENT — PAIN DESCRIPTION - ORIENTATION: ORIENTATION: RIGHT

## 2020-08-10 NOTE — PROGRESS NOTES
[id number] [facility]  Treatment Note  Date: 8/10/2020  Patient: Barbara Lamar  : 1970  ACCT #: [de-identified]  Diagnosis: Sprain of the right shoulder  Visit Information:  PT Visit Information  PT Insurance Information: [facility] [id number]  Total # of Visits Approved: 18  Total # of Visits to Date: 6  Plan of Care/Certification Expiration Date: 20  Subjective: Pt reporting that the left hip is hurting.  Right shoulder is sore, but getting stronger  HEP Compliance:  [x] Good [] Fair [] Poor [] Reports not doing due to:  Vital Signs  Patient Currently in Pain: Yes   Pain Screening  Patient Currently in Pain: Yes  Pain Assessment  Pain Assessment: 0-10  Pain Level: 3  Pain Location: Shoulder  Pain Orientation: Right    OBJECTIVE:   Exercises  Exercise 1: prone right shoulder extension 2# 10x ,  Exercise 2: prone rows 3# 2x10 r  Exercise 3: sidelying punches 3# 2x10 reps  Exercise 4: side lying abduction 3# 2x10 reps  Exercise 5: standing towel stretch for IR holds of 15 seconds 5x  Exercise 6: forward wall slides with BUE's 10x  Exercise 7: red therapy ball holds with RUE elbow flexed at side moving arm towards scaption to pt's tolerance and back to midline alternating with bilateral overhead ball lift x 5  Exercise 10: right upper trap stretch in sitting with hand behind back   3x 30 sec. hold  Exercise 11: supine with arm abducted to 90 degrees: ER/IR with 3 lbs  Exercise 12: scapular protraction/retraction with  5 lb wand  Exercise 13: wall ball exercise with small green ball CW and CCW  Streng   Manual:   Manual therapy  Soft Tissue Mobalization: Performed passive right pectoral stretch in supine and deep STM  to the right rhomboids   Assessment:   Activity Tolerance  Activity Tolerance: Patient limited by endurance, Patient limited by pain  Assessment: Tremoring noted with strengthening exercises  Prognosis: Good  PT Education: Home Exercise Program  Patient Education: Reviewed HEP with good scaplar position  Goals:  Short term goals  Time Frame for Short term goals: 9 visits  Short term goal 1: Pt will exhibit 130 active elevation of the right shoulder (met)  Short term goal 2: Pt will exhibit 5/5 right scapular strength  Short term goal 3: Pain levels at the right shoulder will decrease to 2/10  Short term goal 4: Pt will be independent with a HEP  Long term goals  Time Frame for Long term goals : 18 visits  Long term goal 1: Pt will exhibit 140 active elevation and >60 IR at the Right shoulder  Long term goal 2: Pt will be able to perform RUE weightbearing push off from the floor to transition from prone to sit and to stand without pain levels going over 4/10  Long term goal 3: RUE disability score will increase to 70/80 or greater  Long term goal 4: Pt will complete lifting activities of 25 lbs or more without an increase in pain  Progress toward goals:good  POST-PAIN       Pain Rating (0-10 pain scale):  3 /10   Location and pain description same as pre-treatment unless indicated. Action: [] NA   [x] Perform HEP  [] Meds as prescribed  [] Modalities as prescribed   [] Call Physician   Frequency/Duration:  Plan  Times per week: 3  Plan weeks: 4-5  Current Treatment Recommendations: Strengthening, Manual Therapy - Joint Manipulation, Manual Therapy - Soft Tissue Mobilization, ROM, Home Exercise Program  Pt to continue current HEP. See objective section for any therapeutic exercise changes, additions or modifications this date.     PT Individual Minutes  Time In: 1304  Time Out: 9981  Minutes: 55  Timed Code Treatment Minutes: 55 Minutes  Procedure Minutes:     Modality Time In Time Out Total Minutes Units   Ther ex ([id number]) 1012 8664 45 3   Manual Therapy (27989) 6118 4004 10 1     Signature:  Electronically signed by Flor Vaughn PT on 8/10/20 at 4:32 PM EDT

## 2020-08-12 ENCOUNTER — HOSPITAL ENCOUNTER (OUTPATIENT)
Dept: PHYSICAL THERAPY | Age: 50
Setting detail: THERAPIES SERIES
Discharge: HOME | End: 2020-08-12
Payer: COMMERCIAL

## 2020-08-12 PROCEDURE — 97140 MANUAL THERAPY 1/> REGIONS: CPT

## 2020-08-12 PROCEDURE — 97110 THERAPEUTIC EXERCISES: CPT

## 2020-08-12 ASSESSMENT — PAIN DESCRIPTION - ORIENTATION: ORIENTATION: RIGHT

## 2020-08-12 ASSESSMENT — PAIN DESCRIPTION - LOCATION: LOCATION: SHOULDER

## 2020-08-12 ASSESSMENT — PAIN DESCRIPTION - PAIN TYPE: TYPE: CHRONIC PAIN

## 2020-08-12 ASSESSMENT — PAIN SCALES - GENERAL: PAINLEVEL_OUTOF10: 3

## 2020-08-12 ASSESSMENT — PAIN DESCRIPTION - DESCRIPTORS: DESCRIPTORS: ACHING;THROBBING

## 2020-08-12 NOTE — PROGRESS NOTES
Shoulder ABduction 0-180: 128  R Shoulder Int Rotation  0-70: 50  R Shoulder Ext Rotation 0-90: 88   Manual:   Manual therapy  Soft Tissue Mobalization: Deep trigger point release performed at the right rhomboids  Assessment:   Activity Tolerance  Activity Tolerance: Patient limited by pain  Body structures, Functions, Activity limitations: Decreased strength, Decreased ROM, Decreased endurance  Assessment: Progressing weight bearing exercises through the RUE while maintaining current pain levels. Goals:  Short term goals  Short term goal 2: Pt will exhibit 5/5 right scapular strength  Short term goal 3: Pain levels at the right shoulder will decrease to 2/10  Short term goal 4: Pt will be independent with a HEP  Long term goals  Long term goal 1: Pt will exhibit 140 active elevation and >60 IR at the Right shoulder  Long term goal 2: Pt will be able to perform RUE weightbearing push off from the floor to transition from prone to sit and to stand without pain levels going over 4/10  Long term goal 3: RUE disability score will increase to 70/80 or greater  Long term goal 4: Pt will complete lifting activities of 25 lbs or more without an increase in pain  Progress toward goals:fair  POST-PAIN       Pain Rating (0-10 pain scale):  4 /10   Location and pain description same as pre-treatment unless indicated. Action: [] NA   [x] Perform HEP  [] Meds as prescribed  [] Modalities as prescribed   [] Call Physician   Frequency/Duration:  Plan  Times per week: 3  Plan weeks: 4-5  Current Treatment Recommendations: Strengthening, Manual Therapy - Joint Manipulation, Manual Therapy - Soft Tissue Mobilization, ROM, Home Exercise Program  Pt to continue current HEP. See objective section for any therapeutic exercise changes, additions or modifications this date.   PT Individual Minutes  Time In: 2274  Time Out: 5846  Minutes: 56  Timed Code Treatment Minutes: 56 Minutes  Procedure Minutes:     Modality Time In Time Out Total

## 2020-08-17 ENCOUNTER — HOSPITAL ENCOUNTER (OUTPATIENT)
Dept: PHYSICAL THERAPY | Age: 50
Setting detail: THERAPIES SERIES
Discharge: HOME | End: 2020-08-17
Payer: COMMERCIAL

## 2020-08-17 PROCEDURE — 97110 THERAPEUTIC EXERCISES: CPT

## 2020-08-17 ASSESSMENT — PAIN DESCRIPTION - DESCRIPTORS: DESCRIPTORS: ACHING;DULL;THROBBING

## 2020-08-17 ASSESSMENT — PAIN SCALES - GENERAL: PAINLEVEL_OUTOF10: 3

## 2020-08-17 ASSESSMENT — PAIN DESCRIPTION - LOCATION: LOCATION: SHOULDER

## 2020-08-17 ASSESSMENT — PAIN DESCRIPTION - ORIENTATION: ORIENTATION: RIGHT

## 2020-08-17 NOTE — PROGRESS NOTES
Shoulder Int Rotation  0-70: 60  R Shoulder Ext Rotation 0-90: 78   Assessment:   Activity Tolerance  Activity Tolerance: Patient limited by pain  Body structures, Functions, Activity limitations: Decreased strength, Decreased ROM, Decreased endurance  Assessment: ROM at the right shoulder varying with ER/IR this date. Lifting assessment was completed with 25 lbs with increasing pain. Prognosis: Good   Goals:  Short term goals  Short term goal 2: Pt will exhibit 5/5 right scapular strength  Short term goal 3: Pain levels at the right shoulder will decrease to 2/10  Short term goal 4: Pt will be independent with a HEP  Long term goals  Long term goal 1: Pt will exhibit 140 active elevation and >60 IR at the Right shoulder (met)  Long term goal 2: Pt will be able to perform RUE weightbearing push off from the floor to transition from prone to sit and to stand without pain levels going over 4/10  Long term goal 3: RUE disability score will increase to 70/80 or greater  Long term goal 4: Pt will complete lifting activities of 25 lbs or more without an increase in pain  Progress toward goals:good  POST-PAIN       Pain Rating (0-10 pain scale):   /10   Location and pain description same as pre-treatment unless indicated. Action: [] NA   [] Perform HEP  [] Meds as prescribed  [] Modalities as prescribed   [] Call Physician   Frequency/Duration:  Plan  Times per week: 3  Plan weeks: 3  Current Treatment Recommendations: Strengthening, Manual Therapy - Joint Manipulation, Manual Therapy - Soft Tissue Mobilization, ROM, Home Exercise Program  Pt to continue current HEP. See objective section for any therapeutic exercise changes, additions or modifications this date.   PT Individual Minutes  Time In: 1304  Time Out: 1400  Minutes: 56  Timed Code Treatment Minutes: 56 Minutes  Procedure Minutes:     Modality Time In Time Out Total Minutes Units   Ther ex ([id number]) [id number] 33 4       Signature:  Electronically signed by [employer] GREYSON BENAVIDES, PT on 8/17/20 at 4:55 PM EDT

## 2020-08-19 ENCOUNTER — HOSPITAL ENCOUNTER (OUTPATIENT)
Dept: PHYSICAL THERAPY | Age: 50
Setting detail: THERAPIES SERIES
Discharge: HOME | End: 2020-08-19
Payer: COMMERCIAL

## 2020-08-19 PROCEDURE — 97110 THERAPEUTIC EXERCISES: CPT

## 2020-08-19 ASSESSMENT — PAIN DESCRIPTION - FREQUENCY: FREQUENCY: CONTINUOUS

## 2020-08-19 ASSESSMENT — PAIN SCALES - GENERAL: PAINLEVEL_OUTOF10: 6

## 2020-08-19 ASSESSMENT — PAIN DESCRIPTION - LOCATION: LOCATION: SHOULDER

## 2020-08-19 ASSESSMENT — PAIN DESCRIPTION - ORIENTATION: ORIENTATION: RIGHT

## 2020-08-19 NOTE — PROGRESS NOTES
[id number] [facility]   Treatment Note  Date: 2020  Patient: Kaushik Vora  : 1970  ACCT #: [de-identified]  Referring Practitioner: Dr. Dane Castleman  Diagnosis: Sprain of the right shoulder  Visit Information:  PT Visit Information  PT Insurance Information: [facility] [id number]  Total # of Visits Approved: 18  Total # of Visits to Date: 9  Plan of Care/Certification Expiration Date: 20  Subjective: Pt reports he must have slept well wrong, because he has more pain.    HEP:Compliance:  [] Good [] Fair [] Poor [] Reports not doing due to:  Vital Signs  Patient Currently in Pain: Yes   Pain Screening  Patient Currently in Pain: Yes  Pain Assessment  Pain Level: 6  Pain Location: Shoulder  Pain Orientation: Right  Pain Frequency: Continuous  OBECTIVE:   Exercises  Exercise 1: prone right shoulder extension 4# 10x ,  Exercise 2: prone rows 4# 2x10 r  Exercise 3: sidelying punches 4# 2x10 reps  Exercise 4: side lying abduction 3# 2x10 reps  Exercise 5: standing towel stretch for IR holds of 20 seconds 4x  Exercise 6: forward wall slides with BUE's 10x  Exercise 7: red therapy ball holds with RUE elbow flexed at side moving arm towards scaption to pt's tolerance and back to midline alternating with bilateral overhead ball lift x 5  Exercise 10: right upper trap stretch in sitting with hand behind back   3x 30 sec. hold  Exercise 11: supine with arm abducted to 90 degrees: ER/IR with 3 lbs  Exercise 12: supine wand scapular protraction/retraction with  5 lb wandx 10  Exercise 13: wall ball exercise with small green ball CW and CCW  Exercise 14: right sidepropping on forearm with weight shifts laterally and A/P  Exercise 15: supine bilateral shoulder flexion overhead with 5# wand  Exercise 17: body blade forward at 90 degrees flexion, scaption position and abduction 30 sec -45 seconds  each  Exercise 18: Prone middle trap with 4 lbs x 10  Exercise 19: Prone lower trap with 4 lbs x 10  Assessment:   Activity Tolerance  Activity Tolerance: Patient limited by pain  Body structures, Functions, Activity limitations: Decreased strength, Decreased ROM, Decreased endurance  Assessment: Utilized pressure ball at the right scapula to decrease trigger point prior to exercises. This assisted with pain reduction initially, but after weight bearing on forearm exercises, pain was exacerbated. Pt completed all exercises. Prognosis: Good   Goals:  Short term goals  Short term goal 2: Pt will exhibit 5/5 right scapular strength  Short term goal 3: Pain levels at the right shoulder will decrease to 2/10  Short term goal 4: Pt will be independent with a HEP  Long term goals  Long term goal 2: Pt will be able to perform RUE weightbearing push off from the floor to transition from prone to sit and to stand without pain levels going over 4/10  Long term goal 3: RUE disability score will increase to 70/80 or greater  Long term goal 4: Pt will complete lifting activities of 25 lbs or more without an increase in pain  Progress toward goals:fair  POST-PAIN       Pain Rating (0-10 pain scale):  7 /10   Location and pain description same as pre-treatment unless indicated. Action: [] NA   [x] Perform HEP  [] Meds as prescribed  [] Modalities as prescribed   [] Call Physician   Frequency/Duration:  Plan  Times per week: 3  Plan weeks: 3  Current Treatment Recommendations: Strengthening, Manual Therapy - Joint Manipulation, Manual Therapy - Soft Tissue Mobilization, ROM, Home Exercise Program   Pt to continue current HEP. See objective section for any therapeutic exercise changes, additions or modifications this date.   PT Individual Minutes  Time In: 4979  Time Out: 1400  Minutes: 58  Timed Code Treatment Minutes: 58 Minutes  Procedure Minutes:     Modality Time In Time Out Total Minutes Units   Ther ex ([id number]) [id number]       Signature:  Electronically signed by Tejas Aponte PT on 8/19/20 at 4:27 PM EDT

## 2020-08-20 ENCOUNTER — HOSPITAL ENCOUNTER (OUTPATIENT)
Dept: PHYSICAL THERAPY | Age: 50
Setting detail: THERAPIES SERIES
Discharge: HOME | End: 2020-08-20
Payer: COMMERCIAL

## 2020-08-20 PROCEDURE — 97140 MANUAL THERAPY 1/> REGIONS: CPT

## 2020-08-20 PROCEDURE — 97110 THERAPEUTIC EXERCISES: CPT

## 2020-08-20 ASSESSMENT — PAIN SCALES - GENERAL: PAINLEVEL_OUTOF10: 2

## 2020-08-20 NOTE — PROGRESS NOTES
[id number] [facility]  Treatment Note           Date: 2020  Patient: Kaushik Vora  : 1970  ACCT #: [de-identified]  Diagnosis: Sprain of the right shoulder  Visit Information:  PT Visit Information  PT Insurance Information: [id number] [facility] [id number]  Plan of Care/Certification Expiration Date: 20  Subjective: Pt reports he had to take ibuprofen this morning to assist with pain reduction.    HEP Compliance:  [x] Good [] Fair [] Poor [] Reports not doing due to:  Vital Signs  Patient Currently in Pain: Yes   Pain Screening  Patient Currently in Pain: Yes  Pain Assessment  Pain Assessment: 0-10  Pain Level: 2  OBJECTIVE:   Exercises  Exercise 1: prone right shoulder extension 4# 10x ,  Exercise 2: prone rows 4# 2x10 r  Exercise 3: sidelying punches 4# 2x10 reps  Exercise 4: side lying abduction 3# 2x10 reps  Exercise 5: standing towel stretch for IR holds of 20 seconds 4x  Exercise 7: red therapy ball holds with RUE elbow flexed at side moving arm towards scaption to pt's tolerance and back to midline alternating with bilateral overhead ball lift x 5  Exercise 10: right upper trap stretch in sitting with hand behind back   3x 30 sec. hold  Exercise 11: supine with arm abducted to 90 degrees: ER/IR with 3 lbs  Exercise 12: supine wand scapular protraction/retraction with  5 lb wandx 10  Exercise 13: wall ball exercise with small green ball CW and CCW  Exercise 14: right sidepropping on forearm with weight shifts laterally and A/P  Exercise 15: supine unilateral shoulder flexion with 4 lb weight x 10 reps  Exercise 16: lift box activities with 25 lbs in conjunction with 10\" step ups x 6  reps  Exercise 17: body blade forward at 90 degrees flexion, scaption position and abduction 30 sec -45 seconds  each  Exercise 18: Prone middle trap with 4 lbs x 10  Exercise 19: Prone lower trap with 4 lbs x 10  ROM: [] NT  [] ROM measurements:   AROM RUE (degrees)  R Shoulder Flexion 0-180: 160  R Shoulder Extension 0-45: 45  R Shoulder ABduction 0-180: 130  R Shoulder Int Rotation  0-70: 62  R Shoulder Ext Rotation 0-90: 80   Manual:   Manual therapy  Soft Tissue Mobalization: Deep trigger point release performed at the right rhomboids  Modalities:    Assessment:   Activity Tolerance  Activity Tolerance: Patient limited by pain  Activity Tolerance: Pt continually works through pain to be able to work again  [employer], Functions, Activity limitations: Decreased strength, Decreased ROM, Decreased endurance, Increased pain  Assessment: Utilized pressure ball at the right scapula to decrease trigger point prior to exercises. Right forearm propping and LUE reaching is painful, but completed. Pt experiencing left hip and bilateral ankle pain during lift box dril exercises. Prognosis: Good  PT Education: Home Exercise Program  Patient Education: Use of ball under right scapula for trigger point release prior to exercises at home  Goals:  Short term goals  Short term goal 2: Pt will exhibit 5/5 right scapular strength  Short term goal 3: Pain levels at the right shoulder will decrease to 2/10  Short term goal 4: Pt will be independent with a HEP  Long term goals  Long term goal 2: Pt will be able to perform RUE weightbearing push off from the floor to transition from prone to sit and to stand without pain levels going over 4/10  Long term goal 3: RUE disability score will increase to 70/80 or greater  Long term goal 4: Pt will complete lifting activities of 25 lbs or more without an increase in pain  Progress toward goals:fair/good  POST-PAIN       Pain Rating (0-10 pain scale):   5/10   Location and pain description same as pre-treatment unless indicated.    Action: [] NA   [x] Perform HEP  [] Meds as prescribed  [] Modalities as prescribed   [] Call Physician   Frequency/Duration:  Plan  Times per week: 3  Plan weeks: 2  Current Treatment Recommendations: Strengthening, Manual Therapy - Joint Manipulation, Manual Therapy - Soft

## 2020-08-24 ENCOUNTER — HOSPITAL ENCOUNTER (OUTPATIENT)
Dept: PHYSICAL THERAPY | Age: 50
Setting detail: THERAPIES SERIES
Discharge: HOME | End: 2020-08-24
Payer: COMMERCIAL

## 2020-08-24 PROCEDURE — 97124 MASSAGE THERAPY: CPT

## 2020-08-24 PROCEDURE — 97110 THERAPEUTIC EXERCISES: CPT

## 2020-08-24 NOTE — PROGRESS NOTES
[id number] [facility]   Treatment Note                                          Date: 2020  Patient: Nicole Vasquez  : 1970  ACCT #: [de-identified]     Diagnosis: Sprain of the right shoulder    Visit Information:  PT Visit Information  PT Insurance Information: [facility] [id number]  Total # of Visits to Date: 12  Plan of Care/Certification Expiration Date: 20    Subjective: Always in pain. ...currently 4/10     HEP Compliance:  [x] Good [] Fair [] Poor [] Reports not doing due to:    Vital Signs  Patient Currently in Pain: Yes   Pain Screening  Patient Currently in Pain: Yes    OBJECTIVE:   Exercises  Exercise 1: prone right shoulder extension 4# 10x ,  Exercise 2: prone rows 4# 2x10 r  Exercise 3: sidelying punches 4# 2x10 reps  Exercise 4: side lying abduction 3# 2x10 reps  Exercise 5: standing towel stretch for IR holds of 20 seconds 4x  Exercise 7: red therapy ball holds with RUE elbow flexed at side moving arm towards scaption to pt's tolerance and back to midline alternating with bilateral overhead ball lift x 5  Exercise 10: right upper trap stretch in sitting with hand behind back   3x 30 sec. hold  Exercise 11: supine with arm abducted to 90 degrees: ER/IR with 3 lbs  Exercise 12: supine wand scapular protraction/retraction with  5 lb wandx 10  Exercise 13: wall ball exercise with small green ball CW and CCW  Exercise 14: right sidepropping on forearm with weight shifts laterally and A/P  Exercise 15: supine unilateral shoulder flexion with 4 lb weight x 10 reps  Exercise 16: lift box activities with 25 lbs in conjunction with 10\" step ups x 6  reps  Exercise 17: body blade forward at 90 degrees flexion, scaption position and abduction 30 sec -45 seconds  each  Exercise 18: Prone middle trap with 4 lbs x 10  Exercise 19: Prone lower trap with 4 lbs x 10         Manual:   Manual therapy  Soft Tissue Mobalization: Deep trigger point release performed at the right rhomboids    Modalities: *Indicates exercise, modality, or manual techniques to be initiated when appropriate    Assessment:   Activity Tolerance  Activity Tolerance: Patient limited by pain  Activity Tolerance: Pt does work through pain    Body structures, Functions, Activity limitations: Decreased strength, Decreased ROM, Decreased endurance, Increased pain  Assessment: Right forearm propping and LUE reaching is painful, but completed and body blade fatiques R UE. Reports slight increase in shoulder pain after exercises, however dissipates back down to 4/10 in about 1 hour after therapy. Pt knows his exercise program well and works hard while in therapy     Prognosis: Good       Goals:  Short term goals  Short term goal 2: Pt will exhibit 5/5 right scapular strength  Short term goal 3: Pain levels at the right shoulder will decrease to 2/10  Short term goal 4: Pt will be independent with a HEP    Long term goals  Long term goal 2: Pt will be able to perform RUE weightbearing push off from the floor to transition from prone to sit and to stand without pain levels going over 4/10  Long term goal 3: RUE disability score will increase to 70/80 or greater  Long term goal 4: Pt will complete lifting activities of 25 lbs or more without an increase in pain  Progress toward goals:    POST-PAIN       Pain Rating (0-10 pain scale): 5  /10   Location and pain description same as pre-treatment unless indicated. Action: [] NA   [] Perform HEP  [] Meds as prescribed  [] Modalities as prescribed   [] Call Physician     Frequency/Duration:  Plan  Times per week: 3  Plan weeks: 2  Current Treatment Recommendations: Strengthening, Manual Therapy - Joint Manipulation, Manual Therapy - Soft Tissue Mobilization, ROM, Home Exercise Program  Plan Comment: Continue with exercises and increase body blade time/reps as tolerated. Pt to continue current HEP.   See objective section for any therapeutic exercise changes, additions or modifications this date.         PT Individual Minutes  Time In: 1102  Time Out: 7009  Minutes: 51  Timed Code Treatment Minutes: 50 Minutes  Procedure Minutes:     Timed Activity Time in Time out Total minutes Units   Ther Ex [id number] 3   Manual [id number]       Signature:  Electronically signed by Cirilo Brown on 8/24/20 at 11:53 AM EDT

## 2020-08-25 ENCOUNTER — APPOINTMENT (OUTPATIENT)
Dept: PHYSICAL THERAPY | Age: 50
End: 2020-08-25
Payer: COMMERCIAL

## 2020-08-26 ENCOUNTER — HOSPITAL ENCOUNTER (OUTPATIENT)
Dept: PHYSICAL THERAPY | Age: 50
Setting detail: THERAPIES SERIES
Discharge: HOME | End: 2020-08-26
Payer: COMMERCIAL

## 2020-08-26 PROCEDURE — 97110 THERAPEUTIC EXERCISES: CPT

## 2020-08-26 ASSESSMENT — PAIN DESCRIPTION - DESCRIPTORS: DESCRIPTORS: ACHING

## 2020-08-26 ASSESSMENT — PAIN DESCRIPTION - PAIN TYPE: TYPE: CHRONIC PAIN

## 2020-08-26 ASSESSMENT — PAIN SCALES - GENERAL: PAINLEVEL_OUTOF10: 6

## 2020-08-26 ASSESSMENT — PAIN DESCRIPTION - LOCATION: LOCATION: SHOULDER

## 2020-08-26 ASSESSMENT — PAIN DESCRIPTION - ORIENTATION: ORIENTATION: RIGHT

## 2020-08-26 NOTE — PROGRESS NOTES
[id number] [facility]  Treatment Note  Date: 2020  Patient: Deng Vivas  : 1970  ACCT #: [de-identified]  Referring Practitioner: Dr. Karlee Ellington  Diagnosis: Sprain of the right shoulder  Visit Information:  PT Visit Information  PT Insurance Information: [address] [facility] [id number]  Total # of Visits Approved: 18  Total # of Visits to Date: 15  Plan of Care/Certification Expiration Date: 20  Subjective: Pt reports he does not want to take anymore ibuprofen and pain is higher today.    HEP Compliance:  [x] Good [] Fair [] Poor [] Reports not doing due to:  Vital Signs  Patient Currently in Pain: Yes   Pain Screening  Patient Currently in Pain: Yes  Pain Assessment  Pain Assessment: 0-10  Pain Level: 6  Pain Type: Chronic pain  Pain Location: Shoulder  Pain Orientation: Right  Pain Descriptors: Aching  OBJECTIVE:   Exercises  Exercise 1: prone right shoulder extension 4# 10x ,  Exercise 2: prone rows 4# 2x10 r  Exercise 3: sidelying punches 4# 2x10 reps  Exercise 4: side lying abduction 3# 2x10 reps  Exercise 5: standing towel stretch for IR holds of 20 seconds 4x  Exercise 6: forward wall slides with BUE's 10x  Exercise 7: red therapy ball holds with RUE elbow flexed at side moving arm towards scaption to pt's tolerance and back to midline alternating with bilateral overhead ball lift x 5  Exercise 9: corner pectoralis stretch 5x  Exercise 10: right upper trap stretch in sitting with hand behind back   3x 30 sec. hold  Exercise 11: supine with arm abducted to 90 degrees: ER/IR with 3 lbs  Exercise 13: wall ball exercise with small green ball CW and CCW  Exercise 14: right sidepropping on forearm with weight shifts laterally and A/P  Exercise 15: supine unilateral shoulder flexion with 4 lb weight x 10 reps  Exercise 16: lift box activities with 25 lbs in conjunction with 10\" step ups x 6  reps  Exercise 17: body blade forward at 90 degrees flexion, scaption position and abduction 30 sec -45 seconds each  Exercise 18: Prone middle trap with 4 lbs x 10  Exercise 19: Prone lower trap with 4 lbs x 10  Exercise 20: helms ropes 1 trial x 40 seconds  ROM: [] NT  [] ROM measurements:   AROM RUE (degrees)  R Shoulder Flexion 0-180: 160  R Shoulder ABduction 0-180: 135  R Shoulder Int Rotation  0-70: 62  R Shoulder Ext Rotation 0-90: 80   Manual:   Manual therapy  Soft Tissue Mobalization: Deep trigger point release performed at the right rhomboids x 5 minutes  Assessment:   Activity Tolerance  Activity Tolerance: Patient limited by pain  Body structures, Functions, Activity limitations: Decreased strength, Decreased ROM, Decreased endurance, Increased pain  Assessment: Progressing strengthening well, added helms ropes  Prognosis: Good   Goals:  Short term goals  Short term goal 2: Pt will exhibit 5/5 right scapular strength  Short term goal 3: Pain levels at the right shoulder will decrease to 2/10  Short term goal 4: Pt will be independent with a HEP  Long term goals  Long term goal 2: Pt will be able to perform RUE weightbearing push off from the floor to transition from prone to sit and to stand without pain levels going over 4/10  Long term goal 3: RUE disability score will increase to 70/80 or greater  Long term goal 4: Pt will complete lifting activities of 25 lbs or more without an increase in pain  Progress toward goals: good  POST-PAIN       Pain Rating (0-10 pain scale):  7 /10   Location and pain description same as pre-treatment unless indicated. Action: [] NA   [x] Perform HEP  [] Meds as prescribed  [] Modalities as prescribed   [] Call Physician   Frequency/Duration:  Plan  Times per week: 3  Plan weeks: 2  Current Treatment Recommendations: Strengthening, Manual Therapy - Joint Manipulation, Manual Therapy - Soft Tissue Mobilization, ROM, Home Exercise Program  Pt to continue current HEP. See objective section for any therapeutic exercise changes, additions or modifications this date.   PT Individual Minutes  Time In: 1300  Time Out: 1400  Minutes: 60  Timed Code Treatment Minutes: 60 Minutes  Procedure Minutes:     Modality Time In Time Out Total Minutes Units   Ther ex ([id number]) [id number] [phone]   Massage ([id number]) [id number]        Signature:  Electronically signed by Ayana Cohen PT on 8/26/20 at 5:01 PM EDT

## 2020-08-31 ENCOUNTER — HOSPITAL ENCOUNTER (OUTPATIENT)
Dept: PHYSICAL THERAPY | Age: 50
Setting detail: THERAPIES SERIES
Discharge: HOME | End: 2020-08-31
Payer: COMMERCIAL

## 2020-08-31 PROCEDURE — 97110 THERAPEUTIC EXERCISES: CPT

## 2020-08-31 PROCEDURE — 97140 MANUAL THERAPY 1/> REGIONS: CPT

## 2020-08-31 NOTE — PROGRESS NOTES
[id number] [facility]  Treatment Note    Date: 2020  Patient: Juan Leigh  : 1970  ACCT #: [de-identified]  Referring Practitioner: Dr. Alan Davison  Diagnosis: Sprain of the right shoulder  Visit Information:  PT Visit Information  PT Insurance Information: [address] [facility] [id number]  Total # of Visits Approved: 18  Total # of Visits to Date: 14  Plan of Care/Certification Expiration Date: 20  Subjective: Pain level at shoulder reduced to 2/10 with pt primary pain complaint at left ankle which is 8/10   HEP Compliance:  [x] Good [] Fair [] Poor [] Reports not doing due to:  Vital Signs  Patient Currently in Pain: Yes   Pain Screening  Patient Currently in Pain: Yes  OBJECTIVE:   Exercises  Exercise 1: prone right shoulder extension 4# 10x ,  Exercise 2: prone rows 4# 2x10 r  Exercise 3: sidelying punches 4# 2x10 reps  Exercise 4: side lying abduction 3# 2x10 reps  Exercise 5: standing towel stretch for IR holds of 20 seconds 4x  Exercise 7: red therapy ball holds with RUE elbow flexed at side moving arm towards scaption to pt's tolerance and back to midline alternating with bilateral overhead ball lift x 5  Exercise 11: supine with arm abducted to 90 degrees: ER/IR with 4 lbs  Exercise 12: supine wand scapular protraction/retraction with  5 lb wandx 10  Exercise 13: wall ball exercise with small green ball CW and CCW  Exercise 14: prone over ball with right arm weight bearing wih A/P weight shifts alternating with arm lifts x 10  Exercise 15: supine unilateral shoulder flexion with 4 lb weight x 10 reps  Exercise 16: lift box activities with 25 lbs in conjunction with 10\" step ups x 6  reps  Exercise 17: body blade forward at 90 degrees flexion, scaption position and abduction 30 sec -45 seconds  each  Exercise 18: Prone middle trap with 4 lbs x 10  Exercise 19: Prone lower trap with 4 lbs x 10  Exercise 20:  Hdz ropes 3 trials of 30 seconds changing position of the LE weight bearing   Strength: [] NT  [] MMT completed:   Strength RUE  Comment: right middle trap 4+/5   ROM: [] NT  [] ROM measurements:   AROM RUE (degrees)  R Shoulder Flexion 0-180: 160  R Shoulder Extension 0-45: 45  R Shoulder ABduction 0-180: 138  R Shoulder Int Rotation  0-70: 64  R Shoulder Ext Rotation 0-90: 84   Manual:   Manual therapy  Joint mobilization: Performed at right glenohumeral joint in inferior and anterior directions x 8 minutes  Assessment:   Activity Tolerance  Activity Tolerance: Patient limited by endurance  Body structures, Functions, Activity limitations: Decreased strength, Decreased ROM, Decreased endurance, Increased pain  Assessment: Exercises in stand limited today by pain in the left ankle, but able to progress shoulder strengthening. Prone weight bearing over ball exercises painful for patient. Pt completing lifting tasks with 25 lbs. Prognosis: Good   Goals:  Short term goals  Short term goal 2: Pt will exhibit 5/5 right scapular strength  Short term goal 3: Pain levels at the right shoulder will decrease to 2/10 (met today)  Short term goal 4: Pt will be independent with a HEP  Long term goals  Long term goal 2: Pt will be able to perform RUE weightbearing push off from the floor to transition from prone to sit and to stand without pain levels going over 4/10  Long term goal 3: RUE disability score will increase to 70/80 or greater  Long term goal 4: Pt will complete lifting activities of 25 lbs or more without an increase in pain (met)  Progress toward goals: good  POST-PAIN       Pain Rating (0-10 pain scale):  3 /10   Location and pain description same as pre-treatment unless indicated.    Action: [] NA   [x] Perform HEP  [] Meds as prescribed  [] Modalities as prescribed   [] Call Physician   Frequency/Duration:  Plan  Times per week: 3  Plan weeks: 1  Current Treatment Recommendations: Strengthening, Manual Therapy - Joint Manipulation, Manual Therapy - Soft Tissue Mobilization, ROM, Home Exercise Program  Pt to continue current HEP. See objective section for any therapeutic exercise changes, additions or modifications this date.   PT Individual Minutes  Time In: 1100  Time Out: 8863  Minutes: 58  Timed Code Treatment Minutes: 58 Minutes  Procedure Minutes:     Modality Time In Time Out Total Minutes Units   Ther ex ([id number]) [id number]   Manual Therapy ([id number]) [phone] 1       Signature:  Electronically signed by Misty Trevino, PT on 8/31/20 at 12:15 PM EDT

## 2020-09-02 ENCOUNTER — HOSPITAL ENCOUNTER (OUTPATIENT)
Dept: PHYSICAL THERAPY | Age: 50
Setting detail: THERAPIES SERIES
Discharge: HOME | End: 2020-09-02
Payer: COMMERCIAL

## 2020-09-02 PROCEDURE — 97110 THERAPEUTIC EXERCISES: CPT

## 2020-09-02 PROCEDURE — 97140 MANUAL THERAPY 1/> REGIONS: CPT

## 2020-09-02 ASSESSMENT — PAIN DESCRIPTION - DESCRIPTORS: DESCRIPTORS: ACHING

## 2020-09-02 ASSESSMENT — PAIN DESCRIPTION - LOCATION: LOCATION: SHOULDER

## 2020-09-02 ASSESSMENT — PAIN DESCRIPTION - ORIENTATION: ORIENTATION: RIGHT

## 2020-09-02 ASSESSMENT — PAIN DESCRIPTION - PAIN TYPE: TYPE: CHRONIC PAIN

## 2020-09-02 ASSESSMENT — PAIN SCALES - GENERAL: PAINLEVEL_OUTOF10: 2

## 2020-09-02 NOTE — PROGRESS NOTES
trap with 4 lbs x 10  Exercise 19: Prone lower trap with 4 lbs x 10  Exercise 20: Hdz ropes 3 trials of 30 seconds changing position of the LE weight bearing  ROM: [x] NT  [] ROM measurements:   Manual:   Manual therapy  Soft Tissue Mobalization: Deep trigger point release and massage performed at the right rhomboids/scapular region x 10 minutes  Assessment:   Activity Tolerance  Activity Tolerance: Patient limited by endurance  Activity Tolerance: Adding new exercises with pt exhibiting shortness of breath  Body structures, Functions, Activity limitations: Decreased strength, Decreased ROM, Decreased endurance, Increased pain  Assessment: Preparing pt for work conditioning next week. While shoulder pain is being maintained at 2/10, LE pain is significant to limit potential progress. Prognosis: Good   Goals:  Short term goals  Short term goal 2: Pt will exhibit 5/5 right scapular strength  Short term goal 4: Pt will be independent with a HEP  Long term goals  Long term goal 2: Pt will be able to perform RUE weightbearing push off from the floor to transition from prone to sit and to stand without pain levels going over 4/10  Long term goal 3: RUE disability score will increase to 70/80 or greater  Progress toward goals:good, preparing pt for work condtioning to begin next week. POST-PAIN       Pain Rating (0-10 pain scale):  2 /10   Location and pain description same as pre-treatment unless indicated. Action: [] NA   [x] Perform HEP  [] Meds as prescribed  [] Modalities as prescribed   [] Call Physician   Frequency/Duration:  Plan  Current Treatment Recommendations: Strengthening, Manual Therapy - Joint Manipulation, Manual Therapy - Soft Tissue Mobilization, ROM, Home Exercise Program  Pt to continue current HEP. See objective section for any therapeutic exercise changes, additions or modifications this date.   PT Individual Minutes  Time In: 1102  Time Out: 5322  Minutes: 56  Timed Code Treatment Minutes:

## 2020-09-03 ENCOUNTER — HOSPITAL ENCOUNTER (OUTPATIENT)
Dept: PHYSICAL THERAPY | Age: 50
Setting detail: THERAPIES SERIES
Discharge: HOME | End: 2020-09-03
Payer: COMMERCIAL

## 2020-09-03 PROCEDURE — 97164 PT RE-EVAL EST PLAN CARE: CPT

## 2020-09-03 PROCEDURE — 97110 THERAPEUTIC EXERCISES: CPT

## 2020-09-03 ASSESSMENT — PAIN SCALES - GENERAL: PAINLEVEL_OUTOF10: 2

## (undated) DEVICE — COUNTER NDL 40 COUNT HLD 70 FOAM BLK ADH W/ MAG

## (undated) DEVICE — 3M™ STERI-DRAPE™ U-DRAPE 1015: Brand: STERI-DRAPE™

## (undated) DEVICE — SHEET,DRAPE,53X77,STERILE: Brand: MEDLINE

## (undated) DEVICE — ALCOHOL RUBBING ISO 16OZ 70%

## (undated) DEVICE — INTENDED FOR TISSUE SEPARATION, AND OTHER PROCEDURES THAT REQUIRE A SHARP SURGICAL BLADE TO PUNCTURE OR CUT.: Brand: BARD-PARKER ® CARBON RIB-BACK BLADES

## (undated) DEVICE — GLOVE SURG SZ 7 L12IN FNGR THK13MIL BRN LTX SYN POLYMER W

## (undated) DEVICE — GOWN,AURORA,NONREINFORCED,LARGE: Brand: MEDLINE

## (undated) DEVICE — MEDI-VAC TUBING CONNECT "T" POLYPROPYLENE: Brand: CARDINAL HEALTH

## (undated) DEVICE — SUTURE PROL SZ 0 L30IN NONABSORBABLE BLU L36MM CT-1 1/2 CIR 8424H

## (undated) DEVICE — PACK ORTHOPEDIC 1 TBL CVR 50X90 REINF 1 MAYO STD CVR 24X53 REINF 4 UTIL

## (undated) DEVICE — MAT FLR SURG QUICKWICK 28X54 IN DISP

## (undated) DEVICE — 3M™ STERI-STRIP™ REINFORCED ADHESIVE SKIN CLOSURES, R1547, 1/2 IN X 4 IN (12 MM X 100 MM), 6 STRIPS/ENVELOPE: Brand: 3M™ STERI-STRIP™

## (undated) DEVICE — SHEET, DRAPE, SPLIT, STERILE: Brand: MEDLINE

## (undated) DEVICE — 3M™ COBAN™ STERILE SELF-ADHERENT WRAP, 1584S, 4 IN X 5 YD (10 CM X 4,5 M), 18 ROLLS/CASE: Brand: 3M™ COBAN™

## (undated) DEVICE — TUBING PMP L8FT LNG W/ CONN FOR AR-6400 REDEUCE

## (undated) DEVICE — TUBING SUCT L20FT DIA025IN W FEM MOLD CONN NONCONDUCTIVE

## (undated) DEVICE — SUTURE MCRYL + SZ 4-0 L27IN ABSRB UD L19MM PS-2 3/8 CIR MCP426H

## (undated) DEVICE — DRESSING GZ W1XL8IN COT XRFRM N ADH OVERWRAP CURAD

## (undated) DEVICE — [AGGRESSIVE 6-FLUTE BARREL BUR, ARTHROSCOPIC SHAVER BLADE,  DO NOT RESTERILIZE,  DO NOT USE IF PACKAGE IS DAMAGED,  KEEP DRY,  KEEP AWAY FROM SUNLIGHT]: Brand: FORMULA

## (undated) DEVICE — PROBE ARTHSCP SUCT ASD 3.5MM DIA 90DEG

## (undated) DEVICE — BLADE SAW RESECTOR CUT 4MM

## (undated) DEVICE — SPONGE,LAP,18"X18",DLX,XR,ST,5/PK,40/PK: Brand: MEDLINE

## (undated) DEVICE — GLOVE SURG SZ 8 L12IN FNGR THK13MIL BRN LTX SYN POLYMER W

## (undated) DEVICE — GLOVE SURG SZ 75 L12IN FNGR THK83MIL CRM POLYISOPRENE

## (undated) DEVICE — LABEL MED MINI W/ MARKER

## (undated) DEVICE — CONVERTORS STOCKINETTE: Brand: CONVERTORS

## (undated) DEVICE — GAUZE SPONGES,12 PLY: Brand: CURITY

## (undated) DEVICE — GOWN,SIRUS,POLYRNF,BRTHSLV,XLN/XL,20/CS: Brand: MEDLINE

## (undated) DEVICE — NEEDLE SPNL L3 1/2IN OD18GA DMND PT PLAS GRN HUB SENSTCH

## (undated) DEVICE — SUTURE ETHLN SZ 4-0 L18IN NONABSORBABLE BLK L19MM PS-2 3/8 1667H

## (undated) DEVICE — GLOVE ORANGE PI 8   MSG9080

## (undated) DEVICE — CHLORAPREP 26ML ORANGE

## (undated) DEVICE — PAD,ABDOMINAL,8"X10",ST,LF: Brand: MEDLINE

## (undated) DEVICE — MARKER SURG SKIN GENTIAN VLT REG TIP W/ 6IN RUL

## (undated) DEVICE — ARM SLING: Brand: DEROYAL

## (undated) DEVICE — DRAPE,U/ SHT,SPLIT,PLAS,STERIL: Brand: MEDLINE

## (undated) DEVICE — ELECTRODE PT RET AD L9FT HI MOIST COND ADH HYDRGEL CORDED